# Patient Record
Sex: FEMALE | Race: WHITE
[De-identification: names, ages, dates, MRNs, and addresses within clinical notes are randomized per-mention and may not be internally consistent; named-entity substitution may affect disease eponyms.]

---

## 2020-07-29 ENCOUNTER — HOSPITAL ENCOUNTER (INPATIENT)
Dept: HOSPITAL 63 - ER | Age: 40
LOS: 2 days | Discharge: HOME | DRG: 690 | End: 2020-07-31
Attending: INTERNAL MEDICINE | Admitting: INTERNAL MEDICINE
Payer: SELF-PAY

## 2020-07-29 VITALS — SYSTOLIC BLOOD PRESSURE: 102 MMHG | DIASTOLIC BLOOD PRESSURE: 66 MMHG

## 2020-07-29 VITALS — BODY MASS INDEX: 26.12 KG/M2 | WEIGHT: 153 LBS | HEIGHT: 64 IN

## 2020-07-29 VITALS — SYSTOLIC BLOOD PRESSURE: 100 MMHG | DIASTOLIC BLOOD PRESSURE: 68 MMHG

## 2020-07-29 DIAGNOSIS — F17.210: ICD-10-CM

## 2020-07-29 DIAGNOSIS — Z83.3: ICD-10-CM

## 2020-07-29 DIAGNOSIS — N10: Primary | ICD-10-CM

## 2020-07-29 DIAGNOSIS — E87.6: ICD-10-CM

## 2020-07-29 DIAGNOSIS — N17.9: ICD-10-CM

## 2020-07-29 DIAGNOSIS — Z87.442: ICD-10-CM

## 2020-07-29 DIAGNOSIS — Z87.01: ICD-10-CM

## 2020-07-29 LAB
% BANDS: 1 % (ref 0–9)
% LYMPHS: 4 % (ref 24–48)
% MONOS: 5 % (ref 0–10)
% SEGS: 90 % (ref 35–66)
ALBUMIN SERPL-MCNC: 3.3 G/DL (ref 3.4–5)
ALBUMIN/GLOB SERPL: 0.8 {RATIO} (ref 1–1.7)
ALP SERPL-CCNC: 71 U/L (ref 46–116)
ALT SERPL-CCNC: 34 U/L (ref 14–59)
ANION GAP SERPL CALC-SCNC: 12 MMOL/L (ref 6–14)
APTT PPP: (no result) S
AST SERPL-CCNC: 33 U/L (ref 15–37)
BACTERIA #/AREA URNS HPF: (no result) /HPF
BASOPHILS # BLD AUTO: 0 X10^3/UL (ref 0–0.2)
BASOPHILS NFR BLD: 0 % (ref 0–3)
BILIRUB SERPL-MCNC: 0.3 MG/DL (ref 0.2–1)
BILIRUB UR QL STRIP: (no result)
BUN/CREAT SERPL: 11 (ref 6–20)
CA-I SERPL ISE-MCNC: 14 MG/DL (ref 7–20)
CALCIUM SERPL-MCNC: 8.8 MG/DL (ref 8.5–10.1)
CHLORIDE SERPL-SCNC: 101 MMOL/L (ref 98–107)
CO2 SERPL-SCNC: 22 MMOL/L (ref 21–32)
CREAT SERPL-MCNC: 1.3 MG/DL (ref 0.6–1)
EOSINOPHIL NFR BLD: 0 % (ref 0–3)
EOSINOPHIL NFR BLD: 0 X10^3/UL (ref 0–0.7)
ERYTHROCYTE [DISTWIDTH] IN BLOOD BY AUTOMATED COUNT: 13 % (ref 11.5–14.5)
FIBRINOGEN PPP-MCNC: (no result) MG/DL
GFR SERPLBLD BASED ON 1.73 SQ M-ARVRAT: 45.1 ML/MIN
GLOBULIN SER-MCNC: 4 G/DL (ref 2.2–3.8)
GLUCOSE SERPL-MCNC: 138 MG/DL (ref 70–99)
GLUCOSE UR STRIP-MCNC: 100 MG/DL
HCT VFR BLD CALC: 35.2 % (ref 36–47)
HGB BLD-MCNC: 12 G/DL (ref 12–15.5)
LYMPHOCYTES # BLD: 0.5 X10^3/UL (ref 1–4.8)
LYMPHOCYTES NFR BLD AUTO: 4 % (ref 24–48)
MCH RBC QN AUTO: 31 PG (ref 25–35)
MCHC RBC AUTO-ENTMCNC: 34 G/DL (ref 31–37)
MCV RBC AUTO: 90 FL (ref 79–100)
MONO #: 1 X10^3/UL (ref 0–1.1)
MONOCYTES NFR BLD: 8 % (ref 0–9)
NEUT #: 11.4 X10^3UL (ref 1.8–7.7)
NEUTROPHILS NFR BLD AUTO: 88 % (ref 31–73)
NITRITE UR QL STRIP: (no result)
PLATELET # BLD AUTO: 228 X10^3/UL (ref 140–400)
PLATELET # BLD EST: ADEQUATE 10*3/UL
POTASSIUM SERPL-SCNC: 3.4 MMOL/L (ref 3.5–5.1)
PROT SERPL-MCNC: 7.3 G/DL (ref 6.4–8.2)
RBC # BLD AUTO: 3.91 X10^6/UL (ref 3.5–5.4)
RBC #/AREA URNS HPF: (no result) /HPF (ref 0–2)
SODIUM SERPL-SCNC: 135 MMOL/L (ref 136–145)
SP GR UR STRIP: 1.01
SQUAMOUS #/AREA URNS LPF: (no result) /LPF
UROBILINOGEN UR-MCNC: 1 MG/DL
WBC # BLD AUTO: 12.9 X10^3/UL (ref 4–11)
WBC #/AREA URNS HPF: >40 /HPF (ref 0–4)

## 2020-07-29 PROCEDURE — 99406 BEHAV CHNG SMOKING 3-10 MIN: CPT

## 2020-07-29 PROCEDURE — 80048 BASIC METABOLIC PNL TOTAL CA: CPT

## 2020-07-29 PROCEDURE — 96361 HYDRATE IV INFUSION ADD-ON: CPT

## 2020-07-29 PROCEDURE — 87077 CULTURE AEROBIC IDENTIFY: CPT

## 2020-07-29 PROCEDURE — 85007 BL SMEAR W/DIFF WBC COUNT: CPT

## 2020-07-29 PROCEDURE — 96375 TX/PRO/DX INJ NEW DRUG ADDON: CPT

## 2020-07-29 PROCEDURE — 81001 URINALYSIS AUTO W/SCOPE: CPT

## 2020-07-29 PROCEDURE — 76770 US EXAM ABDO BACK WALL COMP: CPT

## 2020-07-29 PROCEDURE — 36415 COLL VENOUS BLD VENIPUNCTURE: CPT

## 2020-07-29 PROCEDURE — 87086 URINE CULTURE/COLONY COUNT: CPT

## 2020-07-29 PROCEDURE — 80053 COMPREHEN METABOLIC PANEL: CPT

## 2020-07-29 PROCEDURE — 96365 THER/PROPH/DIAG IV INF INIT: CPT

## 2020-07-29 PROCEDURE — 87186 SC STD MICRODIL/AGAR DIL: CPT

## 2020-07-29 PROCEDURE — 85025 COMPLETE CBC W/AUTO DIFF WBC: CPT

## 2020-07-29 PROCEDURE — 85027 COMPLETE CBC AUTOMATED: CPT

## 2020-07-29 PROCEDURE — 74176 CT ABD & PELVIS W/O CONTRAST: CPT

## 2020-07-29 PROCEDURE — 83735 ASSAY OF MAGNESIUM: CPT

## 2020-07-29 RX ADMIN — ACETAMINOPHEN PRN MG: 325 TABLET, FILM COATED ORAL at 23:33

## 2020-07-29 NOTE — PHYS DOC
General Adult


EDM:


Chief Complaint:  FLANK PAIN





HPI:


HPI:


39-year-old female presents with left flank pain.  The patient's pain started 

yesterday.  It started out of the clear blue.  It has intensified today and is 

now severe.  He is also felt feverish.  She does have a fever on arrival.  

Patient has had kidney stones in the past.  She is also had urinary tract 

infections.  She denies any falls or trauma.  She has no other complaints.





Review of Systems:


Review of Systems:


Constitutional: Fever


Eyes:  Denies change in visual acuity 


HENT:  Denies nasal congestion or sore throat 


Respiratory:  Denies cough or shortness of breath 


Cardiovascular:  Denies chest pain or edema 


GI:  Denies abdominal pain, nausea, vomiting, bloody stools or diarrhea 


: Dysuria


Musculoskeletal: Left flank pain


Integument:  Denies rash 


Neurologic:  Denies headache, focal weakness or sensory changes 


Endocrine:  Denies polyuria or polydipsia 


Lymphatic:  Denies swollen glands 


Psychiatric:  Denies depression or anxiety





Heart Score:


Risk Factors:


Risk Factors:  DM, Current or recent (<one month) smoker, HTN, HLP, family 

history of CAD, obesity.


Risk Scores:


Score 0 - 3:  2.5% MACE over next 6 weeks - Discharge Home


Score 4 - 6:  20.3% MACE over next 6 weeks - Admit for Clinical Observation


Score 7 - 10:  72.7% MACE over next 6 weeks - Early Invasive Strategies





Current Medications:


Current Meds:





Current Medications








 Medications


  (Trade)  Dose


 Ordered  Sig/[facility]  Start Time


 Stop Time Status Last Admin


Dose Admin


 


 Acetaminophen


  (Tylenol)  1,000 mg  1X  ONCE  7/29/20 16:45


 7/29/20 16:46 DC 7/29/20 16:58


1,000 MG


 


 Sodium Chloride  1,000 ml @ 


 1,000 mls/hr  1X  ONCE  7/29/20 16:45


 7/29/20 17:44  7/29/20 16:57


1,000 MLS/HR











Allergies:


Allergies:





Allergies








Coded Allergies Type Severity Reaction Last Updated Verified


 


  No Known Drug Allergies    7/29/20 No











Physical Exam:


PE:





Constitutional: Well developed, well nourished, mild acute distress, non-toxic 

appearance. []


HENT: Normocephalic, atraumatic, bilateral external ears normal, oropharynx 

moist, no oral exudates, nose normal. []


Eyes: PERRLA, EOMI, conjunctiva normal, no discharge. [] 


Neck: Normal range of motion, no tenderness, supple, no stridor. [] 


Cardiovascular: Heart rate regular rhythm, no murmur []


Lungs & Thorax:  Bilateral breath sounds clear to auscultation []


Abdomen: Bowel sounds normal, soft, no tenderness, no masses, no pulsatile 

masses. [] 


Skin: Warm, dry, no erythema, no rash. [] 


Back: No tenderness, left CVA tenderness. [] 


Extremities: No tenderness, no cyanosis, no clubbing, ROM intact, no edema. [] 


Neurologic: Alert and oriented X 3, normal motor function, normal sensory 

function, no focal deficits noted. []


Psychologic: Affect normal, judgement normal, mood normal. []





EKG:


EKG:


[]





Radiology/Procedures:


Radiology/Procedures:


[]


Impressions:


RENAL COMPLETE BILATERAL


 


History: Flank pain. Fever. Reason: concern for kidney stone / Spl. 


Instructions:  / History: 


 


Comparison: None.


 


Procedure: Transabdominal ultrasound images are obtained of the kidneys 


and bladder.


 


Findings:


Right kidney: measures 13.8 x 5.4 x 4.9 cm.  Normal cortical echotexture. 


Corticomedullary differentiation is preserved. No hydronephrosis. 


 


Left kidney: measures 12.9 x 5.6 x 6.8 cm.  Normal cortical echotexture. 


Corticomedullary differentiation is preserved. No hydronephrosis. 


 


Urinary bladder: No urinary bladder wall thickening. Bilateral ureteral 


jets were identified.


 


The IVC is normal caliber. The visualized abdominal aorta is normal 


caliber.


 


IMPRESSION: 


1.  Unremarkable renal ultrasound.


 


Electronically signed by: Greg Gaming DO (7/29/2020 5:45 PM) [facility]














DICTATED AND SIGNED BY:     GREG GAMING DO


DATE:     07/29/20 1745





CC: URI WOMACK DO; PCP,NO ~





Course & Med Decision Making:


Course & Med Decision Making


Pertinent Labs and Imaging studies reviewed. (See chart for details)


Our CT machine is down and unable to be used at this time.  We did a 

retroperitoneal ultrasound of the kidneys and there was no hydronephrosis or 

obvious stone.  The patient does have a urinary tract infection.  Given her 

fever, this appears to be pyelonephritis.  I will give her 1 g of Rocephin IV in

 the emergency room.  I spoke with Dr. Giraldo and he has accepted the patient for 

admission.  I spoke with the patient and she is in agreement with admission.


[]





Dragon Disclaimer:


Dragon Disclaimer:


This electronic medical record was generated, in whole or in part, using a voice

 recognition dictation system.





Departure


Departure:


Impression:  


   Primary Impression:  


   Pyelonephritis


Disposition:  09 ADMITTED AS INPATIENT


Admitting Physician:  Antony Giraldo


Condition:  STABLE


Referrals:  


PCP,NO (PCP)





Justification of Admission:


Justification of Admission:


Justification of Admission Dx:  Comment:


Comments:


Pyelonephritis











URI WOMACK DO                 Jul 29, 2020 17:19

## 2020-07-29 NOTE — RAD
RENAL COMPLETE BILATERAL

 

History: Flank pain. Fever. Reason: concern for kidney stone / Spl. 

Instructions:  / History: 

 

Comparison: None.

 

Procedure: Transabdominal ultrasound images are obtained of the kidneys 

and bladder.

 

Findings:

Right kidney: measures 13.8 x 5.4 x 4.9 cm.  Normal cortical echotexture. 

Corticomedullary differentiation is preserved. No hydronephrosis. 

 

Left kidney: measures 12.9 x 5.6 x 6.8 cm.  Normal cortical echotexture. 

Corticomedullary differentiation is preserved. No hydronephrosis. 

 

Urinary bladder: No urinary bladder wall thickening. Bilateral ureteral 

jets were identified.

 

The IVC is normal caliber. The visualized abdominal aorta is normal 

caliber.

 

IMPRESSION: 

1.  Unremarkable renal ultrasound.

 

Electronically signed by: Ab Hollis DO (7/29/2020 5:45 PM) [facility]

## 2020-07-29 NOTE — NUR
The patient, DINESH CHEN, 38 y/o, F admitted by JONATHAN ZURITA MD, was given written 
information regarding hospital policies, unit procedures and contact persons.  



Pt accompanied onto the unit by EMS personnel via gurney. Pt able to transfer from gurney to 
bed independently. Vital signs assessed and stable. Pt reports that she has been having 
"Pain in her kidneys" and burning upon urination x1 day. Pt denies other complaints. Pt 
requested heating pad for comfort. Valuables were checked and left in room with pt. Oriented 
pt to room and call light. Call light within reach.

## 2020-07-30 VITALS — DIASTOLIC BLOOD PRESSURE: 66 MMHG | SYSTOLIC BLOOD PRESSURE: 98 MMHG

## 2020-07-30 VITALS — SYSTOLIC BLOOD PRESSURE: 115 MMHG | DIASTOLIC BLOOD PRESSURE: 72 MMHG

## 2020-07-30 VITALS — SYSTOLIC BLOOD PRESSURE: 96 MMHG | DIASTOLIC BLOOD PRESSURE: 62 MMHG

## 2020-07-30 VITALS — SYSTOLIC BLOOD PRESSURE: 104 MMHG | DIASTOLIC BLOOD PRESSURE: 67 MMHG

## 2020-07-30 VITALS — DIASTOLIC BLOOD PRESSURE: 66 MMHG | SYSTOLIC BLOOD PRESSURE: 104 MMHG

## 2020-07-30 LAB
ALBUMIN SERPL-MCNC: 2.8 G/DL (ref 3.4–5)
ALBUMIN/GLOB SERPL: 0.7 {RATIO} (ref 1–1.7)
ALP SERPL-CCNC: 69 U/L (ref 46–116)
ALT SERPL-CCNC: 35 U/L (ref 14–59)
ANION GAP SERPL CALC-SCNC: 9 MMOL/L (ref 6–14)
AST SERPL-CCNC: 30 U/L (ref 15–37)
BASOPHILS # BLD AUTO: 0 X10^3/UL (ref 0–0.2)
BASOPHILS NFR BLD: 0 % (ref 0–3)
BILIRUB SERPL-MCNC: 0.3 MG/DL (ref 0.2–1)
BUN/CREAT SERPL: 10 (ref 6–20)
CA-I SERPL ISE-MCNC: 10 MG/DL (ref 7–20)
CALCIUM SERPL-MCNC: 8.4 MG/DL (ref 8.5–10.1)
CHLORIDE SERPL-SCNC: 101 MMOL/L (ref 98–107)
CO2 SERPL-SCNC: 26 MMOL/L (ref 21–32)
CREAT SERPL-MCNC: 1 MG/DL (ref 0.6–1)
EOSINOPHIL NFR BLD: 0.1 X10^3/UL (ref 0–0.7)
EOSINOPHIL NFR BLD: 1 % (ref 0–3)
ERYTHROCYTE [DISTWIDTH] IN BLOOD BY AUTOMATED COUNT: 13.1 % (ref 11.5–14.5)
GFR SERPLBLD BASED ON 1.73 SQ M-ARVRAT: 61.1 ML/MIN
GLOBULIN SER-MCNC: 3.8 G/DL (ref 2.2–3.8)
GLUCOSE SERPL-MCNC: 143 MG/DL (ref 70–99)
HCT VFR BLD CALC: 34.7 % (ref 36–47)
HGB BLD-MCNC: 11.7 G/DL (ref 12–15.5)
LYMPHOCYTES # BLD: 1.1 X10^3/UL (ref 1–4.8)
LYMPHOCYTES NFR BLD AUTO: 10 % (ref 24–48)
MCH RBC QN AUTO: 31 PG (ref 25–35)
MCHC RBC AUTO-ENTMCNC: 34 G/DL (ref 31–37)
MCV RBC AUTO: 91 FL (ref 79–100)
MONO #: 0.8 X10^3/UL (ref 0–1.1)
MONOCYTES NFR BLD: 7 % (ref 0–9)
NEUT #: 9.3 X10^3UL (ref 1.8–7.7)
NEUTROPHILS NFR BLD AUTO: 83 % (ref 31–73)
PLATELET # BLD AUTO: 201 X10^3/UL (ref 140–400)
POTASSIUM SERPL-SCNC: 3.4 MMOL/L (ref 3.5–5.1)
PROT SERPL-MCNC: 6.6 G/DL (ref 6.4–8.2)
RBC # BLD AUTO: 3.81 X10^6/UL (ref 3.5–5.4)
SODIUM SERPL-SCNC: 136 MMOL/L (ref 136–145)
WBC # BLD AUTO: 11.2 X10^3/UL (ref 4–11)

## 2020-07-30 RX ADMIN — ACETAMINOPHEN PRN MG: 325 TABLET, FILM COATED ORAL at 16:15

## 2020-07-30 RX ADMIN — MORPHINE SULFATE PRN MG: 4 INJECTION, SOLUTION INTRAMUSCULAR; INTRAVENOUS at 08:11

## 2020-07-30 RX ADMIN — MORPHINE SULFATE PRN MG: 4 INJECTION, SOLUTION INTRAMUSCULAR; INTRAVENOUS at 03:11

## 2020-07-30 RX ADMIN — MORPHINE SULFATE PRN MG: 4 INJECTION, SOLUTION INTRAMUSCULAR; INTRAVENOUS at 21:33

## 2020-07-30 RX ADMIN — MORPHINE SULFATE PRN MG: 4 INJECTION, SOLUTION INTRAMUSCULAR; INTRAVENOUS at 12:19

## 2020-07-30 RX ADMIN — Medication SCH CAP: at 20:10

## 2020-07-30 RX ADMIN — MORPHINE SULFATE PRN MG: 4 INJECTION, SOLUTION INTRAMUSCULAR; INTRAVENOUS at 17:41

## 2020-07-30 RX ADMIN — ACETAMINOPHEN PRN MG: 325 TABLET, FILM COATED ORAL at 06:38

## 2020-07-30 RX ADMIN — SODIUM CHLORIDE AND POTASSIUM CHLORIDE SCH MLS/HR: 9; 2.98 INJECTION, SOLUTION INTRAVENOUS at 16:15

## 2020-07-30 NOTE — PN
DATE:  07/30/2020



SUBJECTIVE:  The patient is resting, slightly propped up in bed, continued to

complain of severe pain in her left flank area.  It transpired that CT scan at

the [facility] was not working yesterday.  She has never had any CT scan done, was

admitted with acute pyelonephritis, treated with IV antibiotic.  However, as she

has a history of stones before and the CT scan in the hospital is working, we

will arrange for CT scan of the abdomen and pelvis without contrast.  Meanwhile,

continue with IV antibiotic, IV pain medication and IV fluid.



PHYSICAL EXAMINATION:

GENERAL:  When I examined her this afternoon, she looked well and was clearly in

no apparent distress.  No pallor, jaundice, cyanosis or thyromegaly.  No jugular

venous distention.  No limb edema.

VITAL SIGNS:  Her heart rate was 85, blood pressure was 98/66, temperature was

98, respiratory rate was 18.  In fact, this morning, her temperature was up to

100.9, respiratory rate was 18 and oxygen saturation was 98% on room air.

HEENT:  Showed normocephalic, atraumatic.

NECK:  Supple.

HEART:  Showed normal first and second heart sounds.  No gallop, rub or murmur.

CHEST:  Clear to auscultation.  No crepitation or rhonchi.

ABDOMEN:  Distended, soft, nontender.  Tenderness mostly in the left flank area.

 No guarding or rigidity.  No organomegaly.  All hernial orifices intact.  Bowel

sounds normal.

NEUROLOGIC:  She is grossly intact.



LABORATORY DATA:  Her lab work this morning showed a white cell count is 11,200,

hemoglobin 11.7, hematocrit 34, MCV 91, and platelet count 201,000.  Her

chemistry showed a serum sodium 136, potassium 3.4, chloride 101, bicarbonate

26, anion gap of 9, BUN 10, creatinine 1, estimated GFR was 61 mL per minute. 

Her glucose 143, calcium was 8.4.  Total bilirubin, AST, ALT, alkaline

phosphatase were normal.  Total protein was 6.6, albumin 2.8.



ASSESSMENT:  Acute pyelonephritis.



PLAN:  My plan is to arrange for her to have a CT scan of the abdomen and pelvis

without contrast.  Continue with IV antibiotic.  Continue with pain management. 

I will add also IV fluid in the form of normal saline with potassium as she has

hypokalemia.  Once obviously if there is any evidence of any stone with

obstruction, we will add Flomax and we might have to consider transferring her

to a hospital where there is a Urology service.





______________________________

JONATHAN ZURITA MD



DR:  CHAYA/mauri  JOB#:  [id number] / [id number]

DD:  07/30/2020 15:06  DT:  07/30/2020 18:25
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
## 2020-07-30 NOTE — RAD
Axial CT images of the abdomen and pelvis with coronal and sagittal 

reformats were performed without contrast per renal colic protocol.

 

Exposure: One or more of the following individualized dose reduction 

techniques were utilized for this examination:  1. Automated exposure 

control  2. Adjustment of the mA and/or kV according to patient size  3. 

Use of iterative reconstruction technique

 

Indication: Reason: Severe flank pain with previous history of renal 

stones / Spl. Instructions:  / History: 

 

Comparison:  None.

 

Findings:

 

No renal, ureteral, or bladder stones are identified.  No hydronephrosis, 

perinephric fat stranding, or hydroureter are seen bilaterally.

 

The appendix is visualized and is unremarkable in appearance. The 

remainder of the non contrasted abdomen and pelvis is normal in 

appearance, although evaluation is limited on an unenhanced exam.

 

Impression:

1. No evidence of urolithiasis or urinary obstruction.

 

 

Electronically signed by: Romeo Simmons MD (7/30/2020 3:53 PM) [id number]

## 2020-07-30 NOTE — NUR
NURSING NOTE 



PT RESTING IN BED THIS AM UPON ASSESSMENT AND MEDICATION ADMINISTRATION. PT IS A&O. PT C/O 
PAIN 7-8/10. PRN MORPHINE GIVEN. PT RESTING COMFORTABLY. 



IVÁN BLAKE.

## 2020-07-31 VITALS — DIASTOLIC BLOOD PRESSURE: 71 MMHG | SYSTOLIC BLOOD PRESSURE: 108 MMHG

## 2020-07-31 VITALS — SYSTOLIC BLOOD PRESSURE: 101 MMHG | DIASTOLIC BLOOD PRESSURE: 65 MMHG

## 2020-07-31 LAB
ANION GAP SERPL CALC-SCNC: 7 MMOL/L (ref 6–14)
CA-I SERPL ISE-MCNC: 6 MG/DL (ref 7–20)
CALCIUM SERPL-MCNC: 8.1 MG/DL (ref 8.5–10.1)
CHLORIDE SERPL-SCNC: 104 MMOL/L (ref 98–107)
CO2 SERPL-SCNC: 26 MMOL/L (ref 21–32)
CREAT SERPL-MCNC: 0.9 MG/DL (ref 0.6–1)
ERYTHROCYTE [DISTWIDTH] IN BLOOD BY AUTOMATED COUNT: 13 % (ref 11.5–14.5)
GFR SERPLBLD BASED ON 1.73 SQ M-ARVRAT: 69 ML/MIN
GLUCOSE SERPL-MCNC: 119 MG/DL (ref 70–99)
HCT VFR BLD CALC: 31.7 % (ref 36–47)
HGB BLD-MCNC: 10.5 G/DL (ref 12–15.5)
MAGNESIUM SERPL-MCNC: 1.8 MG/DL (ref 1.8–2.4)
MCH RBC QN AUTO: 30 PG (ref 25–35)
MCHC RBC AUTO-ENTMCNC: 33 G/DL (ref 31–37)
MCV RBC AUTO: 92 FL (ref 79–100)
PLATELET # BLD AUTO: 191 X10^3/UL (ref 140–400)
POTASSIUM SERPL-SCNC: 3.8 MMOL/L (ref 3.5–5.1)
RBC # BLD AUTO: 3.46 X10^6/UL (ref 3.5–5.4)
SODIUM SERPL-SCNC: 137 MMOL/L (ref 136–145)
WBC # BLD AUTO: 9.5 X10^3/UL (ref 4–11)

## 2020-07-31 RX ADMIN — SODIUM CHLORIDE AND POTASSIUM CHLORIDE SCH MLS/HR: 9; 2.98 INJECTION, SOLUTION INTRAVENOUS at 10:12

## 2020-07-31 RX ADMIN — SODIUM CHLORIDE AND POTASSIUM CHLORIDE SCH MLS/HR: 9; 2.98 INJECTION, SOLUTION INTRAVENOUS at 03:25

## 2020-07-31 RX ADMIN — MORPHINE SULFATE PRN MG: 4 INJECTION, SOLUTION INTRAMUSCULAR; INTRAVENOUS at 05:57

## 2020-07-31 RX ADMIN — MORPHINE SULFATE PRN MG: 4 INJECTION, SOLUTION INTRAMUSCULAR; INTRAVENOUS at 01:38

## 2020-07-31 RX ADMIN — ACETAMINOPHEN PRN MG: 325 TABLET, FILM COATED ORAL at 01:37

## 2020-07-31 RX ADMIN — Medication SCH CAP: at 08:35

## 2020-07-31 NOTE — NUR
NURSING NOTE DISCHARGE



PT DISCHARGED HOME VIA AMBULATION ACCOMPANIED BY SELF. PT GIVEN WRITTEN AND VERBAL DISCHARGE 
INSTRUCTIONS. SCRIPTS FOR PAIN AND ANTIBIOTICS GIVEN TO PT. NO COMPLICATIONS.



IVÁN BLAKE.
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
## 2020-11-02 ENCOUNTER — HOSPITAL ENCOUNTER (EMERGENCY)
Dept: HOSPITAL 63 - ER | Age: 40
Discharge: HOME | End: 2020-11-02
Payer: SELF-PAY

## 2020-11-02 VITALS — BODY MASS INDEX: 26.17 KG/M2 | WEIGHT: 147.71 LBS | HEIGHT: 63 IN

## 2020-11-02 VITALS — DIASTOLIC BLOOD PRESSURE: 97 MMHG | SYSTOLIC BLOOD PRESSURE: 145 MMHG

## 2020-11-02 DIAGNOSIS — K04.7: ICD-10-CM

## 2020-11-02 DIAGNOSIS — Z87.442: ICD-10-CM

## 2020-11-02 DIAGNOSIS — K02.9: Primary | ICD-10-CM

## 2020-11-02 DIAGNOSIS — Z87.440: ICD-10-CM

## 2020-11-02 PROCEDURE — 99283 EMERGENCY DEPT VISIT LOW MDM: CPT

## 2020-11-02 NOTE — PHYS DOC
Past History


Past Medical History:  Kidney Stones, UTI


Past Surgical History:  Other


Additional Past Surgical Histo:  LEFT LEG


Alcohol Use:  None





General Adult


EDM:


Chief Complaint:  DENTAL PROBLEM





HPI:


HPI:


39-year-old female presents with left lower dental pain and facial swelling.  

Patient states that she started of significant pain followed by swelling 

yesterday.  She has a known bad tooth in this area.  She does not currently see 

a dentist.  She believes that she still has [place] Medicaid.  She has not been

able to switch over to [place] yet.  The pain is moderate to severe in intensity.

 She denies fever or chills.  She has had no drainage from the area as far she 

knows.





Review of Systems:


Review of Systems:


Constitutional:  Denies fever or chills 


Eyes:  Denies change in visual acuity 


HENT:  dental pain


Respiratory:  Denies cough or shortness of breath 


Cardiovascular:  Denies chest pain or edema 


GI:  Denies abdominal pain, nausea, vomiting, bloody stools or diarrhea 


: Denies dysuria 


Musculoskeletal:  Denies back pain or joint pain 


Integument:  Denies rash 


Neurologic:  Denies headache, focal weakness or sensory changes 


Endocrine:  Denies polyuria or polydipsia 


Lymphatic:  Denies swollen glands 


Psychiatric:  Denies depression or anxiety





Allergies:


Allergies:





Allergies








Coded Allergies Type Severity Reaction Last Updated Verified


 


  No Known Drug Allergies    11/2/20 No











Physical Exam:


PE:





Constitutional: Well developed, well nourished, no acute distress, non-toxic 

appearance. []


HENT: Normocephalic, atraumatic, bilateral external ears normal, broken left 

lower molar with surrounding erythematous gums and facial swelling. []


Eyes: PERRLA, EOMI, conjunctiva normal, no discharge. [] 


Neck: Normal range of motion, no tenderness, supple, no stridor. [] 


Cardiovascular:Heart rate regular rhythm, no murmur []


Lungs & Thorax:  Bilateral breath sounds clear to auscultation []


Abdomen: Bowel sounds normal, soft, no tenderness, no masses, no pulsatile 

masses. [] 


Skin: Warm, dry, no erythema, no rash. [] 


Back: No tenderness, no CVA tenderness. [] 


Extremities: No tenderness, no cyanosis, no clubbing, ROM intact, no edema. [] 


Neurologic: Alert and oriented X 3, normal motor function, normal sensory 

function, no focal deficits noted. []


Psychologic: Affect normal, judgement normal, mood normal. []





Current Patient Data:


Vital Signs:





                                   Vital Signs








  Date Time  Temp Pulse Resp B/P (MAP) Pulse Ox O2 Delivery O2 Flow Rate FiO2


 


11/2/20 11:05 98.2 102 18 145/97 (113) 99   











EKG:


EKG:


[]





Radiology/Procedures:


Radiology/Procedures:


[]





Heart Score:


Risk Factors:


Risk Factors:  DM, Current or recent (<one month) smoker, HTN, HLP, family 

history of CAD, obesity.


Risk Scores:


Score 0 - 3:  2.5% MACE over next 6 weeks - Discharge Home


Score 4 - 6:  20.3% MACE over next 6 weeks - Admit for Clinical Observation


Score 7 - 10:  72.7% MACE over next 6 weeks - Early Invasive Strategies





Course & Med Decision Making:


Course & Med Decision Making


Pertinent Labs and Imaging studies reviewed. (See chart for details)


The patient does appear to have an infected tooth.  I did not palpate a 

fluctuant abscess.  I will place her on clindamycin and Norco.  I have advised 

her strongly to find a dentist or public clinic to take care of this tooth this 

week.  She states verbal understanding.  She is stable for discharge at this 

time.


[]





Dragon Disclaimer:


Dragon Disclaimer:


This electronic medical record was generated, in whole or in part, using a voice

 recognition dictation system.





Departure


Departure:


Impression:  


   Primary Impression:  


   Infected dental carries


Disposition:  01 DC HOME SELF CARE/HOMELESS


Condition:  STABLE


Referrals:  


PCP,NO (PCP)


Patient Instructions:  Dental Abscess


Scripts


Clindamycin Hcl (CLINDAMYCIN HCL) 300 Mg Capsule


1 CAP PO BID for dental infection, #14 CAP


   Prov: URI WOMACK DO         11/2/20 


Hydrocodone Bit/Acetaminophen (NORCO 5-325 TABLET) 1 Each Tablet


1 TAB PO PRN Q6HRS PRN for PAIN, #14 TAB 0 Refills


   Prov: URI WOMACK DO         11/2/20











URI WOMACK DO                  Nov 2, 2020 11:49

## 2021-05-11 ENCOUNTER — HOSPITAL ENCOUNTER (EMERGENCY)
Dept: HOSPITAL 63 - ER | Age: 41
Discharge: HOME | End: 2021-05-11
Payer: SELF-PAY

## 2021-05-11 VITALS — DIASTOLIC BLOOD PRESSURE: 99 MMHG | SYSTOLIC BLOOD PRESSURE: 171 MMHG

## 2021-05-11 VITALS — HEIGHT: 63 IN | BODY MASS INDEX: 26.17 KG/M2 | WEIGHT: 147.71 LBS

## 2021-05-11 DIAGNOSIS — Z98.890: ICD-10-CM

## 2021-05-11 DIAGNOSIS — Z87.440: ICD-10-CM

## 2021-05-11 DIAGNOSIS — K02.9: Primary | ICD-10-CM

## 2021-05-11 DIAGNOSIS — Z87.442: ICD-10-CM

## 2021-05-11 PROCEDURE — 99284 EMERGENCY DEPT VISIT MOD MDM: CPT

## 2021-05-11 NOTE — PHYS DOC
Past History


Past Medical History:  Kidney Stones, UTI


Past Surgical History:  , Other


Additional Past Surgical Histo:  LEFT LEG


Alcohol Use:  None





General Adult


EDM:


Chief Complaint:  DENTAL PROBLEM





HPI:


HPI:





Patient is a [age] year old [sex] who presents with []





Review of Systems:


Review of Systems:


Constitutional:  Denies fever or chills 


Eyes:  Denies change in visual acuity 


HENT:  Denies nasal congestion or sore throat 


Respiratory:  Denies cough or shortness of breath 


Cardiovascular:  Denies chest pain or edema 


GI:  Denies abdominal pain, nausea, vomiting, bloody stools or diarrhea 


: Denies dysuria 


Musculoskeletal:  Denies back pain or joint pain 


Integument:  Denies rash 


Neurologic:  Denies headache, focal weakness or sensory changes 


Endocrine:  Denies polyuria or polydipsia 


Lymphatic:  Denies swollen glands 


Psychiatric:  Denies depression or anxiety





Current Medications:


Current Meds:





Current Medications








 Medications


  (Trade)  Dose


 Ordered  Sig/Minda  Start Time


 Stop Time Status Last Admin


Dose Admin


 


 Amoxicillin/


 Clavulanate


 Potassium


  (Augmentin 875/


 125mg)  1 tab  1X  ONCE  21 20:00


 21 20:09 DC 21 20:18


1 TAB


 


 Dexamethasone


  (Decadron)  10 mg  1X  ONCE  21 20:00


 21 20:09 DC 21 20:18


10 MG











Allergies:


Allergies:





Allergies








Coded Allergies Type Severity Reaction Last Updated Verified


 


  No Known Drug Allergies    20 No











Physical Exam:


PE:





Constitutional: Well developed, well nourished, no acute distress, non-toxic 

appearance. []


HENT: Normocephalic, atraumatic, bilateral external ears normal, oropharynx 

moist, no oral exudates, nose normal. []


Eyes: PERRLA, EOMI, conjunctiva normal, no discharge. [] 


Neck: Normal range of motion, no tenderness, supple, no stridor. [] 


Cardiovascular:Heart rate regular rhythm, no murmur []


Lungs & Thorax:  Bilateral breath sounds clear to auscultation []


Abdomen: Bowel sounds normal, soft, no tenderness, no masses, no pulsatile 

masses. [] 


Skin: Warm, dry, no erythema, no rash. [] 


Back: No tenderness, no CVA tenderness. [] 


Extremities: No tenderness, no cyanosis, no clubbing, ROM intact, no edema. [] 


Neurologic: Alert and oriented X 3, normal motor function, normal sensory 

function, no focal deficits noted. []


Psychologic: Affect normal, judgement normal, mood normal. []





Current Patient Data:


Vital Signs:





                                   Vital Signs








  Date Time  Temp Pulse Resp B/P (MAP) Pulse Ox O2 Delivery O2 Flow Rate FiO2


 


21 19:43 98.7 80 18 171/99 (123) 100 Room Air  











EKG:


EKG:


[]





Radiology/Procedures:


Radiology/Procedures:


[]





Heart Score:


Risk Factors:


Risk Factors:  DM, Current or recent (<one month) smoker, HTN, HLP, family 

history of CAD, obesity.


Risk Scores:


Score 0 - 3:  2.5% MACE over next 6 weeks - Discharge Home


Score 4 - 6:  20.3% MACE over next 6 weeks - Admit for Clinical Observation


Score 7 - 10:  72.7% MACE over next 6 weeks - Early Invasive Strategies





Course & Med Decision Making:


Course & Med Decision Making


Pertinent Labs and Imaging studies reviewed. (See chart for details)





[]





Dragon Disclaimer:


Dragon Disclaimer:


This electronic medical record was generated, in whole or in part, using a voice

 recognition dictation system.





Departure


Departure:


Impression:  


   Primary Impression:  


   Dental caries


   Additional Impression:  


   Toothache


Disposition:   HOME / SELF CARE / HOMELESS


Condition:  STABLE


Referrals:  


[facility] (PCP)


Patient Instructions:  Dental Caries, Toothache-Brief





Additional Instructions:  


May take over-the-counter ibuprofen or Aleve in addition to prescribed me

dication


Scripts


Amoxicillin/Potassium Clav (AUGMENTIN 875-125 TABLET) 1 Each Tablet


1 TAB PO BID for Dental infection for 7 Days, #14 TAB 0 Refills


   Prov: TANO FANG DO         21 


Chlorhexidine Gluconate (PERIDEX) 15 Ml Mouthwash


15 ML PO BID for Dental infection, #473 ML 0 Refills


   Prov: TANO FANG DO         21 


Hydrocodone/Acetaminophen (Hydrocodone-Acetamin 5-325 mg) 1 Each Tablet


0.5 EACH PO Q4-6HRS PRN for PAIN, #10 TAB


   Prov: TANO FANG DO         21











TANO FANG DO             May 11, 2021 20:26